# Patient Record
Sex: FEMALE | Race: WHITE
[De-identification: names, ages, dates, MRNs, and addresses within clinical notes are randomized per-mention and may not be internally consistent; named-entity substitution may affect disease eponyms.]

---

## 2018-02-08 ENCOUNTER — HOSPITAL ENCOUNTER (INPATIENT)
Dept: HOSPITAL 68 - ERH | Age: 63
LOS: 1 days | DRG: 552 | End: 2018-02-09
Attending: INTERNAL MEDICINE | Admitting: INTERNAL MEDICINE
Payer: COMMERCIAL

## 2018-02-08 VITALS — BODY MASS INDEX: 28.17 KG/M2 | HEIGHT: 64 IN | WEIGHT: 165 LBS

## 2018-02-08 VITALS — SYSTOLIC BLOOD PRESSURE: 122 MMHG | DIASTOLIC BLOOD PRESSURE: 76 MMHG

## 2018-02-08 VITALS — DIASTOLIC BLOOD PRESSURE: 80 MMHG | SYSTOLIC BLOOD PRESSURE: 112 MMHG

## 2018-02-08 DIAGNOSIS — E78.5: ICD-10-CM

## 2018-02-08 DIAGNOSIS — S12.400A: ICD-10-CM

## 2018-02-08 DIAGNOSIS — R55: ICD-10-CM

## 2018-02-08 DIAGNOSIS — R79.89: ICD-10-CM

## 2018-02-08 DIAGNOSIS — A08.4: ICD-10-CM

## 2018-02-08 DIAGNOSIS — E86.0: ICD-10-CM

## 2018-02-08 DIAGNOSIS — K52.9: ICD-10-CM

## 2018-02-08 DIAGNOSIS — S12.301A: Primary | ICD-10-CM
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
## 2018-02-08 DIAGNOSIS — E04.1: ICD-10-CM

## 2018-02-08 LAB
ABSOLUTE GRANULOCYTE CT: 8.2 /CUMM (ref 1.4–6.5)
APTT BLD: 24 SEC (ref 25–37)
BASOPHILS # BLD: 0 /CUMM (ref 0–0.2)
BASOPHILS NFR BLD: 0.1 % (ref 0–2)
EOSINOPHIL # BLD: 0 /CUMM (ref 0–0.7)
EOSINOPHIL NFR BLD: 0.4 % (ref 0–5)
ERYTHROCYTE [DISTWIDTH] IN BLOOD BY AUTOMATED COUNT: 13 % (ref 11.5–14.5)
GRANULOCYTES NFR BLD: 96.2 % (ref 42.2–75.2)
HCT VFR BLD CALC: 45 % (ref 37–47)
LYMPHOCYTES # BLD: 0.2 /CUMM (ref 1.2–3.4)
MCH RBC QN AUTO: 29.1 PG (ref 27–31)
MCHC RBC AUTO-ENTMCNC: 33.1 G/DL (ref 33–37)
MCV RBC AUTO: 87.8 FL (ref 81–99)
MONOCYTES # BLD: 0.1 /CUMM (ref 0.1–0.6)
PLATELET # BLD: 211 /CUMM (ref 130–400)
PMV BLD AUTO: 7.7 FL (ref 7.4–10.4)
PROTHROMBIN TIME: 11.6 SEC (ref 9.4–12.5)
RED BLOOD CELL CT: 5.12 /CUMM (ref 4.2–5.4)
WBC # BLD AUTO: 8.6 /CUMM (ref 4.8–10.8)

## 2018-02-08 PROCEDURE — G0480 DRUG TEST DEF 1-7 CLASSES: HCPCS

## 2018-02-08 NOTE — CONS- NEUROSURGICAL
General Information and HPI
 
Consulting Request
Date of Consult: 02/08/18
Requested By:
Rosas PONCE
 
Reason for Consult:
Neck pain after fall
Source of Information: patient, family, old records
Exam Limitations: no limitations
History of Present Illness:
62-year-old right-handed white female with no significant past history who 
presented to the emergency room stating that since 1 AM this morning she's had 
multiple episodes of nausea vomiting and diarrhea ASSOCIATED with lower 
abdominal cramping pains.  Around 4:30 in the morning she was sitting on the 
toilet when she had a syncopal episode.  She woke up with her face against a 
vanity which was in front of the toilet.  It is unsure how long she was not 
unconscious.  She was then brought in by her family to the emergency room here. 
She complains of bilateral neck pain and bruising around her left thigh from the
head strike into the vanity
 
Allergies/Medications
Allergies:
Coded Allergies:
No Known Allergies (02/08/18)
 
Current Medications:
 Current Medications
 
 
  Sig/Phyllis Start time  Last
 
Medication Dose Route Stop Time Status Admin
 
Acetaminophen 1,000 MG ONCE ONE 02/08 0800 DC 02/08
 
N/A 1 UNIT IV 02/08 0814  0828
 
Famotidine 20 MG ONCE ONE 02/08 0800 DC 02/08
 
  IV 02/08 0801  0828
 
Morphine Sulfate 0 .STK-MED ONE 02/08 1024 DC 
 
  .ROUTE   
 
Morphine Sulfate 2 MG ONCE ONE 02/08 1000 DC 02/08
 
  IV 02/08 1001  1020
 
Ondansetron HCl 4 MG ONCE ONE 02/08 0800 DC 02/08
 
  IV 02/08 0801  0828
 
Sodium Chloride 1,000 ML BOLUS ONE 02/08 1045 DC 02/08
 
  IV 02/08 1144  1158
 
Sodium Chloride 1,000 ML BOLUS ONE 02/08 0815 DC 02/08
 
  IV 02/08 0914  0828
 
Sodium Chloride 1,000 ML BOLUS ONE 02/08 0815 DC 02/08
 
  IV 02/08 0914  1040
 
 
 
 
Past History
 
Medical History
Blood Transfusion Hx: No
Neurological: NONE
EENT: NONE
Cardiovascular: NONE
Respiratory: NONE
Gastrointestinal: NONE
Hepatic: NONE
Renal: NONE
Musculoskeletal: NONE
Psychiatric: NONE
Endocrine: NONE
Blood Disorders: NONE
Cancer(s): NONE
GYN/Reproductive: tubal ligation
Other Medical Hx:
None
 
Surgical History
Pertinent Surgical History: tubal ligation, ABDOMINOPLASTY
 
Psychosocial History
Where Do You Live? Home
Who Do You Live With? spouse
Services at Home: None
Primary Language: English
Smoking Status: Never Smoked
ETOH Use: occasional use
Illicit Drug Use: denies illicit drug use
 
Functional Ability
ADLs
Independent: dressing, eating, toileting, bathing. 
Ambulation: independent
IADLs
Independent: shopping. 
 
Employment History
Employment: Retired
 
Review of Systems
Review of Systems:
She denies nausea or vomiting at this point in time.  She denies any chest pain 
or shortness of breath.  She was feeling lightheaded this morning.  She has no 
pain or dysesthesias or weakness in her extremities.
 
 
 
There is no back or arm or leg pain
 
There is no visual changes
 
Review of Systems
Constitutional:
Denies: no symptoms. 
EENTM:
Reports: see HPI. 
Cardiovascular:
Denies: no symptoms. 
Respiratory:
Denies: no symptoms. 
GI:
Denies: no symptoms. 
Genitourinary:
Denies: no symptoms. 
Musculoskeletal:
Reports: see HPI. 
Skin:
Denies: no symptoms. 
Neurological/Psychological:
Reports: see HPI. 
Hematologic/Endocrine:
Denies: no symptoms. 
Immunologic/Allergic:
Denies: no symptoms. 
All Other Systems: Reviewed and Negative
 
Exam & Diagnostic Data
Vital Signs and I&O
Vital Signs
 
 
Date Time Temp Pulse Resp B/P B/P Pulse O2 O2 Flow FiO2
 
     Mean Ox Delivery Rate 
 
02/08 1100  94  102/60     
 
02/08 1017 98.3 103 20 125/76  97 Room Air  
 
02/08 0713 97.4 114 15 123/86  97 Room Air Room Air 
 
 
 Intake & Output
 
 
 02/08 1600 02/08 0800 02/08 0000 02/07 1600 02/07 0800 02/07 0000
 
Intake Total 0     
 
Output Total      
 
Balance 0     
 
       
 
Intake, Oral 0     
 
Patient  165 lb    
 
Weight      
 
Weight  Reported by Patient    
 
Measurement      
 
Method      
 
 
 
Physical Exam:
Awake alert and oriented 3.  Cranial nerve symmetrical.  Some spasm of the 
paraspinous muscles of the neck.
 
No nystagmus no dysmetria
 
Good strength in both upper and lower extremities.  No Estefany's or Babinski.  
Slightly hyperreflexic at all levels to 3+ but no evidence of clonus or definite
myelopathy
 
Physical Exam
General Appearance: no apparent distress
Head: ecchymosis
Eyes:
Bilateral: normal appearance. 
Ears, Nose, Throat: normal pharynx
Neck: wearing a hard cervical collar
Respiratory: normal breath sounds
Cardiovascular: regular rate/rhythm
Breasts deferred
Peripheral Pulses:
3+ carotid (R), 3+ carotid (L)
Gastrointestinal: soft
Rectal: deferred
Back: muscle spasm
Extremities: normal inspection
Neurologic/Psych: awake, alert, oriented x 3
Cranial Nerves: normal hearing, normal speech, PERRL
Reflexes:
2+: ankle (R), ankle (L).  3+: bicep (R), bicep (L), tricep (L), tricep (L), 
knee (R), knee (L). 
Skin: intact
Lymphatic: no anterior cervical gokul
Reproductive: Deffered
Pelvic: deferred
Other Physical Findings:
No other significant findings
Last 24 Hours of Labs:
 Laboratory Tests
 
 
 02/08 02/08
 
 1130 0902
 
Chemistry  
 
  Sodium (137 - 145 mmol/L)  141
 
  Potassium (3.5 - 5.1 mmol/L)  4.3
 
  Chloride (98 - 107 mmol/L)  107
 
  Carbon Dioxide (22 - 30 mmol/L)  23
 
  Anion Gap (5 - 16)  12
 
  BUN (7 - 17 mg/dL)  28  H
 
  Creatinine (0.5 - 1.0 mg/dL)  0.7
 
  Estimated GFR (>60 ml/min)  > 60
 
  BUN/Creatinine Ratio (7 - 25 %)  40.0  H
 
  Glucose (65 - 99 mg/dL)  121  H
 
  Calcium (8.4 - 10.2 mg/dL)  9.1
 
  Total Bilirubin (0.2 - 1.3 mg/dL)  0.8
 
  AST (14 - 36 U/L)  20
 
  ALT (9 - 52 U/L)  21
 
  Alkaline Phosphatase (<127 U/L)  65
 
  Troponin I (< 0.11 ng/ml)  < 0.01
 
  Total Protein (6.3 - 8.2 g/dL)  6.8
 
  Albumin (3.5 - 5.0 g/dL)  4.0
 
  Globulin (1.9 - 4.2 gm/dL)  2.8
 
  Albumin/Globulin Ratio (1.1 - 2.2 %)  1.4
 
  Amylase (30 - 110 U/L)  52
 
  Lipase (23 - 300 U/L)  70
 
Coagulation  
 
  PT (9.4 - 12.5 SEC) 11.6 
 
  INR (0.90 - 1.19) 1.11 
 
  APTT (25 - 37 SEC) 24  L 
 
Hematology  
 
  CBC w Diff  MAN DIFF ORDERED
 
  WBC (4.8 - 10.8 /CUMM)  8.6
 
  RBC (4.20 - 5.40 /CUMM)  5.12
 
  Hgb (12.0 - 16.0 G/DL)  14.9
 
  Hct (37 - 47 %)  45.0
 
  MCV (81.0 - 99.0 FL)  87.8
 
  MCH (27.0 - 31.0 PG)  29.1
 
  MCHC (33.0 - 37.0 G/DL)  33.1
 
  RDW (11.5 - 14.5 %)  13.0
 
  Plt Count (130 - 400 /CUMM)  211
 
  MPV (7.4 - 10.4 FL)  7.7
 
  Gran % (42.2 - 75.2 %)  96.2  H
 
  Lymphocytes % (20.5 - 51.1 %)  1.8  L
 
  Monocytes % (1.7 - 9.3 %)  1.5  L
 
  Eosinophils % (0 - 5 %)  0.4
 
  Basophils % (0.0 - 2.0 %)  0.1
 
  Absolute Granulocytes (1.4 - 6.5 /CUMM)  8.2  H
 
  Segmented Neutrophils (42.2 - 75.2 %)  86  H
 
  Band Neutrophils (0.0 - 5.0 %)  7  H
 
  Absolute Lymphocytes (1.2 - 3.4 /CUMM)  0.2  L
 
  Lymphocytes (20.5 - 51.1 %)  3  L
 
  Monocytes (1.7 - 9.3 %)  4
 
  Absolute Monocytes (0.10 - 0.60 /CUMM)  0.1
 
  Absolute Eosinophils (0.0 - 0.7 /CUMM)  0
 
  Absolute Basophils (0.0 - 0.2 /CUMM)  0
 
  Platelet Estimate (ADEQUATE)  ADEQUATE
 
  Normocytic RBCs  VERIFIED
 
  Normochromic RBCs  VERIFIED
 
Toxicology  
 
  Serum Alcohol (<10 MG/DL)  < 10.0
 
 
 
Imaging Results:
CAT scan shows a fracture through the C4 spinous process extending to the 
undersurface of the right L4 lamina extending to the bony foramen 
transversarium.  Fracture through C5 spinous process.  Chronic anterolisthesis 
C2 over C3 and C3 over C4 and C7 over T1
Other Results:
Pending medical workup
 
Assessment/Plan
Assessment/Plan
The fractures in this woman are not unstable.  At this point in time given the 
absence of any symptoms in her arms and legs with not rush to do a MRI.  She can
be followed as an outpatient with her neck in a hard collar.  Possibly at 3 
weeks we could transfer her to a soft collar.  Today CTA is normal breast there 
is no vascular injury.
 
Findings on x-ray were discussed with Jerry Ahuja MD
Problem List:
 1. C4 cervical fracture
 
 2. C5 vertebral fracture
 
 3. Syncope
 
 4. Nausea vomiting and diarrhea
 
Other Findings/Comments:
Dr. Ross
 
Consult Acknowledgment
- Thank you for your consult request.

## 2018-02-08 NOTE — CT SCAN REPORT
CT HEAD WITHOUT IV CONTRAST
CT CERVICAL SPINE WITHOUT IV CONTRAST
 
INDICATION: Fall.
 
COMPARISON: None available.
 
TECHNIQUE: Multidetector CT acquisitions of the head and cervical spine were
obtained without IV contrast. Multiplanar reformats were acquired and
utilized for image interpretation.
 
FINDINGS:
 
HEAD:
There is no intracranial hemorrhage, hydrocephalus, extra-axial surface
collection, midline shift, or other herniation pattern. Gray to white matter
differentiation is diffusely maintained without evidence of an evolved acute
territorial infarct. The basilar cisterns are preserved. No significant soft
tissue abnormality. No acute osseous abnormality. The paranasal sinuses and
the mastoid air cells are well-aerated.
 
CERVICAL SPINE:
There is a fracture through the C4 spinous process extending to the
undersurface of the right C4 lamina. There is also a fracture through the
posterolateral wall of the right C4 bony foramen transversarium that can be
followed with a CTA of the neck to exclude a vertebral artery dissection.
Small osteophyte versus inferior endplate avulsion fragment on the left at
C4. Given this finding, a MRI of the cervical spine would be helpful in
excluding the presence of ligamentous injury and/or epidural collections.
 
There is also a fracture through the C5 spinous process with a small spinous
process avulsion fracture fragment posteriorly.
 
There is slight anterolisthesis of C2 on C3 and C3 on C4. There is mild
anterolisthesis of C7 on T1, T1 and T2, and T2 on T3. There is slight
superior endplate height loss at T3 of indeterminate age. The craniocervical
and atlantoaxial articulations are normal. There is no prevertebral soft
tissue swelling. Multinodular thyroid gland. The visualized lung apices are
clear.
 
IMPRESSION:
- There is a fracture through the C4 spinous process extending to the
undersurface of the right C4 lamina. There is also a fracture through the
posterolateral wall of the right C4 bony foramen transversarium that can be
followed with a CTA of the neck to exclude a vertebral artery dissection.
Small osteophyte versus inferior endplate avulsion fragment on the left at
C4. Given this finding, a MRI of the cervical spine would be helpful in
excluding the presence of ligamentous injury and/or epidural collections.
 
- There is also a fracture through the C5 spinous process with a small
spinous process avulsion fracture fragment posteriorly.
 
- There is slight superior endplate height loss at T3 of indeterminate age.
 
- No acute intracranial abnormality.
 
-Multinodular thyroid gland.

## 2018-02-08 NOTE — ED SYNCOPE COMPLAINT
History of Present Illness
 
General
Chief Complaint: Nausea, Vomiting, Diarrhea
Stated Complaint: V/N/D PER PT PASSED OUT WHILE ON TOILET HIT FACE
Source: patient, family, old records
Exam Limitations: no limitations
 
Vital Signs & Intake/Output
Vital Signs & Intake/Output
 Vital Signs
 
 
Date Time Temp Pulse Resp B/P B/P Pulse O2 O2 Flow FiO2
 
     Mean Ox Delivery Rate 
 
 1250 98.7 110 20 114/75  98 Room Air  
 
 1100  94  102/60     
 
02 1017 98.3 103 20 125/76  97 Room Air  
 
 0713 97.4 114 15 123/86  97 Room Air Room Air 
 
 
 
Allergies
Coded Allergies:
No Known Allergies (18)
 
Reconcile Medications
No Known Home Medications
 
Triage Note:
PT TO ED FOR C/C OF MULTIPLE EPISODES OF VOMITING
AND DIARRHEA SINCE 100.  AROUND 0, PT HAD A
SYNCOPAL EPISODE WHEN PT WAS VOMITING AND THEN
PASSED OUT (UNKNOWN HOW LONG), AND WOKE UP WITH
BRUISE TO L EYE AREA. +NECK PAIN.  DENIES CHANGE
IN VISION.
Triage Nurses Notes Reviewed? yes
Timing: recent history
Context: multiple episodes of diarrhea                    
Loss of Consciousness: unsure
Associated Symptoms: nausea/vomiting
HPI:
62-year-old female with no medical history presents to the ER for evaluation 
stating that since 1:00 this morning she's had multiple episodes of nausea 
vomiting and diarrhea associated with lower abdominal cramping aching pain.  The
patient states around 4:30 in the morning she was sitting on a toilet when she 
states the next thing she recalls she woke up with her face against the vanity 
which is in front of the toilet.  She is unsure how long she passed out for.  
She denies any associated chest pain shortness of breath.  She states she's felt
lightheaded this morning.  She denies tobacco or alcohol use.  The patient has a
history of a tubal ligation and abdominoplasty.  She is also complaining of 
bilateral neck pain and bruising around her left eye from the head strike into 
the vanity.  She has not taken anything for symptoms.  No vision changes 
epistaxis.  No back arm or leg pain.
 
(Darin PONCE,Rosas)
 
Past History
 
Travel History
Traveled to [place] past 21 day No
 
Medical History
Any Pertinent Medical History? none
Neurological: NONE
EENT: NONE
Cardiovascular: NONE
Respiratory: NONE
Gastrointestinal: NONE
Hepatic: NONE
Renal: NONE
Musculoskeletal: NONE
Psychiatric: NONE
Endocrine: NONE
Blood Disorders: NONE
Cancer(s): NONE
 
Surgical History
Surgical History: tubal ligation, ABDOMINOPLASTY
 
Psychosocial History
What is your primary language English
Tobacco Use: Quit >30 days ago
ETOH Use: occasional use
Illicit Drug Use: denies illicit drug use
 
Family History
Hx Contributory? No
(Rosas Damon)
 
Review of Systems
 
Review of Systems
Constitutional:
Reports: see HPI. 
Comments
Review of systems: See HPI, All other systems negative.
Constitutional, no chills no fever,
HEENT:  no sore throat no congestion
Cardiovascular: No chest pain 
Skin:  no rashes, no change in skin
Respiratory: No dyspnea no cough no  sputum
GI: SEE HPI
: No dysuria No hematuria, no frequency
Muscle skeletal: No joint pain, no back pain
Neurologic: headache
Psych: No stress 
Heme/endocrine: No bruising 
Immunology: No lymphadenopathy
(Rosas Damon)
 
Physical Exam
 
Physical Exam
General Appearance: well developed/nourished, alert, awake
Cranial Nerves: normal hearing, normal speech, PERRL
Comments:
Well-developed well-nourished person in no acute distress
HEENT: Small area of ecchymosis noted under the left eye there is no hyphema, 
the rest of the face is atraumatic and nontender no scalp hematoma, Normal EENT 
exam; PERRL, EOMI, no nystagmus. moist mucous membranes.  
Neck: Supple, nontender, normal range of motion without pain or tenderness
Back: Nontender. Full range of motion
Cardiovascular: Regular rate and rhythms no murmur
Respiratory: No respiratory distress.  Patient speaking in full complete 
sentences. Breath sounds clear to auscultation bilaterally: NO W/R/R
Abdomen: Soft, nontender nondistended, no appreciable organomegaly. Normal bowel
sounds. No rebound/guarding, 
Extremity: No edema, full range of motion of extremities, 5 out of 5 strength 
noted to bilateral upper and lower extremities, full sensation noted to b/l ue 
and le
Neuro: Alert oriented x3, motor sensory normal, cranial nerves II through XII 
grossly intact. There were no obvious focal neurologic abnormalities.
Skin: No appreciable rash on exposed skin, skin is warm and dry.
Psych: Mood and affect is normal, memory and judgment is normal.
 
Core Measures
ACS in differential dx? Yes
CVA/TIA Diagnosis: No
Sepsis Present: No
Sepsis Focused Exam Completed? No
(Darin PONCE,Rosas)
 
Progress
Differential Diagnosis: orthostatic syncope, other valvular disease, 
vasodepressor syncope, gastroenteritis, colitis, diverticulitis, ischemic 
colitis, electrolyte abnormalty, dehydaration, ami
Plan of Care:
 Orders
 
 
Procedure Date/time Status
 
Regular Diet  L Complete
 
Heart Healthy Diet  D Active
 
TROPONIN LEVEL  1500 Active
 
ED Holding Orders  1356 Active
 
Admit to inpatient  1356 Active
 
EKG  1307 Active
 
ECHOCARDIOGRAM  1257 Active
 
Lab Add-on Test  1254 Active
 
Pathway - chart  1236 Active
 
House Staff  1236 Active
 
Patient Data  1231 Active
 
ED Holding Orders  1218 Active
 
Vital Signs  1218 Active
 
Code Status  1218 Active
 
Add-on Test (ER Only)  1027 Active
 
EKG  1027 Active
 
Add-on Test (ER Only)  1018 Active
 
Telemetry/Cardiac Monitor  1013 Active
 
PARTIAL THROMBOPLASTIN TIME  1013 Complete
 
PROTHROMBIN TIME  1013 Complete
 
Add-on Test (ER Only)  1007 Active
 
Add-on Test (ER Only)  0949 Active
 
ETHANOL  0902 Complete
 
Intake & Output  0829 Active
 
MISTAKE  0720 Active
 
URINALYSIS  0720 Complete
 
TROPONIN LEVEL  0720 Complete
 
LIPASE  0720 Complete
 
COMPREHENSIVE METABOLIC PANEL  0720 Complete
 
CBC WITHOUT DIFFERENTIAL  0720 Complete
 
AMYLASE  0720 Complete
 
EKG  0717 Active
 
VTE Mechanical Prophylaxis   UNK Active
 
MISTAKE   UNK Active
 
 
 Current Medications
 
 
  Sig/Phyllis Start time  Last
 
Medication Dose  Stop Time Status Admin
 
Enoxaparin Sodium 40 MG DAILY  1000 AC 
 
(Lovenox)     
 
Ibuprofen 800 MG TID  1600 AC 
 
(Motrin)     
 
Acetaminophen 500 MG Q6P PRN  1400 AC 
 
(Tylenol)     
 
 
 Laboratory Tests
 
 
 
18 1420:
Urine Color YEL, Urine Clarity CLEAR, Urine pH 6.5, Ur Specific Gravity 1.010, 
Urine Protein NEG, Urine Ketones NEG, Urine Nitrite NEG, Urine Bilirubin NEG, 
Urine Urobilinogen 0.2, Ur Leukocyte Esterase NEG, Ur Microscopic EXAM NOT 
REQUIRED, Urine Hemoglobin NEG, Urine Glucose NEG
 
18 1130:
PT 11.6, INR 1.11, APTT 24  L
 
18 0902:
Anion Gap 12, Estimated GFR > 60, BUN/Creatinine Ratio 40.0  H, Glucose 121  H, 
Calcium 9.1, Total Bilirubin 0.8, AST 20, ALT 21, Alkaline Phosphatase 65, 
Troponin I < 0.01, Total Protein 6.8, Albumin 4.0, Globulin 2.8, Albumin/
Globulin Ratio 1.4, Amylase 52, Lipase 70, CBC w Diff MAN DIFF ORDERED, RBC 5.12
, MCV 87.8, MCH 29.1, MCHC 33.1, RDW 13.0, MPV 7.7, Gran % 96.2  H, Lymphocytes 
% 1.8  L, Monocytes % 1.5  L, Eosinophils % 0.4, Basophils % 0.1, Absolute 
Granulocytes 8.2  H, Segmented Neutrophils 86  H, Band Neutrophils 7  H, 
Absolute Lymphocytes 0.2  L, Lymphocytes 3  L, Monocytes 4, Absolute Monocytes 
0.1, Absolute Eosinophils 0, Absolute Basophils 0, Platelet Estimate ADEQUATE, 
Normocytic RBCs VERIFIED, Normochromic RBCs VERIFIED, Serum Alcohol < 10.0
orthos negative, labs ordered, pt med with iv fluids, zofran 4mg iv, pepcid 20mg
iv, tylenol 1g iv. ct ordered
 
 
950 I discussed with andriy and her son her ct findings and labs to date, pain
persists, morphine 2mg iv ordered, c collar placed. call placed to neurosurg
 
 
1010 CASE D/W DR DAVIS WHO REVIEWED THE CT SCAN, ADVISED TO GET CTA OF NECK, AS
LONG AS NO VESSEL INJURY PATIENT COULD BE DISCHARGED AND FOLLOW UP WITH HIM AS 
AN OUTPT FROM HIS STANDPOINT- FX ARE NOT UNSTABLE. NO NEED FOR EMERGENT MRI 
HOWEVER WOULD LIKE CTA AT THIS TIME.  HE DOES NOT FEEL PT REQUIRES TRANSFER AT 
THIS TIME BASED ON CSPINE INJURIES. I DISCUSSED WITH THE PT AND HER SON PLAN OF 
CARE, SHE IS RESTING IN NAD AT THIS TIME
 
 
As nurse was attempting to place second IV line patient had witnessed syncopal 
episode she remained normal sinus on the monitor Dr. Ross at bedside, IV 
fluids are running patient immediately came to speaking in full complete 
sentences no slurred speech no neuro deficits.
 
1220 DR DAVIS IN DEPT TO EVAL PT 
 
 
I discussed the patient hurt CAT scan results she denies abdominal pain resting 
comfortably
 
Dr. oRss spoke with Dr. Edouard we'll admit to telemetry.
Diagnostic Imaging:
Viewed by Me: CT Scan.  Discussed w/RAD: CT Scan. 
Radiology Impression: PATIENT: ANDRIY DIXON  MEDICAL RECORD NO: [id number] 
PRESENT AGE: 62  PATIENT ACCOUNT NO: [id number] : 55  LOCATION: ER 
ORDERING PHYSICIAN: Rosas PONCE     SERVICE DATE:  EXAM TYPE: 
CAT - CT ABD & PELVIS W/O IV CONTRAS EXAMINATION: CT ABDOMEN AND PELVIS WITHOUT 
CONTRAST CLINICAL INFORMATION: Left lower quadrant abdominal pain. Nausea, 
vomiting, diarrhea. Syncope. Presumptive diagnosis: Colitis, diverticulitis. 
COMPARISON: None TECHNIQUE: Multidetector volumetric imaging was performed from 
the superior aspect of the liver through the pubic symphysis. Sagittal and 
coronal reformatted images were obtained on the technologist's workstation. DLP:
314.37 mGy-cm FINDINGS: LUNG BASES: The visualized lung bases are unremarkable. 
LIVER, GALLBLADDER, AND BILIARY TREE: The liver is normal in size, shape, and 
attenuation. No focal hepatic lesion or biliary ductal dilatation is present. 
The gallbladder is unremarkable with no evidence of radiopaque gallstones, 
gallbladder wall thickening, or obvious pericholecystic inflammatory changes. 
PANCREAS: Unremarkable. SPLEEN: Unremarkable. ADRENAL GLANDS: Unremarkable. 
KIDNEYS AND URETERS: The kidneys are normal in size, shape, and attenuation. No 
hydronephrosis, hydroureter, or calculi are seen. No perinephric stranding. 
BLADDER: Unremarkable. GASTROINTESTINAL TRACT: There are scattered colonic 
diverticula. There is no evidence of diverticulitis. There is no definite 
colonic wall thickening to suggest colitis. The appendix appears normal. There 
are 2 loops of jejunum in the mid abdomen which are upper normal in caliber. 
There is no definite bowel wall thickening. The small bowel is otherwise 
unremarkable. ABDOMINAL WALL: No significant hernia is appreciated. LYMPH NODES:
Normal. VASCULAR: Unremarkable. PELVIC VISCERA: Unremarkable. OSSEOUS STRUCTURES
: There is severe degenerative disc disease at L3-L4 with a mild grade 1 
anterolisthesis of L3 on L4. There is mild degenerative disc disease at L4-L5. 
IMPRESSION: 1. Mild colonic diverticulosis with no evidence of diverticulitis. 
No colonic wall thickening to suggest suggest colitis. 2. There are 2 loops of 
jejunum in the mid abdomen which are upper normal in caliber but there is no 
evidence of small bowel wall thickening. 3. No other evidence of inflammatory 
process in the abdomen or pelvis. DICTATED BY: Ky Wetzel MD  DATE/TIME 
DICTATED:18 :RAD.LOCO  DATE/TIME TRANSCRIBED:933 CONFIDENTIAL, DO NOT COPY WITHOUT APPROPRIATE AUTHORIZATION.  <
Electronically signed in Other Vendor System>                                   
                                                    SIGNED BY: Ky Wetzel MD 18 1003, PATIENT: ANDRIY DIXON  MEDICAL RECORD NO: [id number] 
PRESENT AGE: 62  PATIENT ACCOUNT NO: [id number] : 55  LOCATION: [facility] 
ORDERING PHYSICIAN: Rosas PONCE     SERVICE DATE:  EXAM TYPE: 
CAT - CT CERV SPINE WO IV CONTRAST; CT HEAD WO IV CONTRAST CT HEAD WITHOUT IV 
CONTRAST CT CERVICAL SPINE WITHOUT IV CONTRAST INDICATION: Fall. COMPARISON: 
None available. TECHNIQUE: Multidetector CT acquisitions of the head and 
cervical spine were obtained without IV contrast. Multiplanar reformats were 
acquired and utilized for image interpretation. FINDINGS: HEAD: There is no 
intracranial hemorrhage, hydrocephalus, extra-axial surface collection, midline 
shift, or other herniation pattern. Gray to white matter differentiation is 
diffusely maintained without evidence of an evolved acute territorial infarct. 
The basilar cisterns are preserved. No significant soft tissue abnormality. No 
acute osseous abnormality. The paranasal sinuses and the mastoid air cells are 
well-aerated. CERVICAL SPINE: There is a fracture through the C4 spinous process
extending to the undersurface of the right C4 lamina. There is also a fracture 
through the posterolateral wall of the right C4 bony foramen transversarium that
can be followed with a CTA of the neck to exclude a vertebral artery dissection.
Small osteophyte versus inferior endplate avulsion fragment on the left at C4. 
Given this finding, a MRI of the cervical spine would be helpful in excluding 
the presence of ligamentous injury and/or epidural collections. There is also a 
fracture through the C5 spinous process with a small spinous process avulsion 
fracture fragment posteriorly. There is slight anterolisthesis of C2 on C3 and 
C3 on C4. There is mild anterolisthesis of C7 on T1, T1 and T2, and T2 on T3. 
There is slight superior endplate height loss at T3 of indeterminate age. The 
craniocervical and atlantoaxial articulations are normal. There is no 
prevertebral soft tissue swelling. Multinodular thyroid gland. The visualized 
lung apices are clear. IMPRESSION: - There is a fracture through the C4 spinous 
process extending to the undersurface of the right C4 lamina. There is also a 
fracture through the posterolateral wall of the right C4 bony foramen 
transversarium that can be followed with a CTA of the neck to exclude a 
vertebral artery dissection. Small osteophyte versus inferior endplate avulsion 
fragment on the left at C4. Given this finding, a MRI of the cervical spine 
would be helpful in excluding the presence of ligamentous injury and/or epidural
collections. - There is also a fracture through the C5 spinous process with a 
small spinous process avulsion fracture fragment posteriorly. - There is slight 
superior endplate height loss at T3 of indeterminate age. - No acute 
intracranial abnormality. -Multinodular thyroid gland. DICTATED BY: Roberto Feng MD  DATE/TIME DICTATED:18 :RAD.LOCO  DATE/TIME
TRANSCRIBED:18 CONFIDENTIAL, DO NOT COPY WITHOUT APPROPRIATE 
AUTHORIZATION.  <Electronically signed in Other Vendor System>                  
                                                                     SIGNED BY: 
Roberto Feng MD 1841, PATIENT: ANDRIY DIXON  MEDICAL RECORD NO: 
[id number] PRESENT AGE: 62  PATIENT ACCOUNT NO: [id number] : 55  LOCATION: [facility]
ORDERING PHYSICIAN: Rosas PONCE     SERVICE DATE:  EXAM TYPE: 
CAT - CT NECK ANGIOGRAM CT ANGIOGRAM NECK CLINICAL INFORMATION: Rule out 
vertebral artery dissection. C4-C5 fracture. COMPARISON: Cervical spine CT 
performed earlier the same day. TECHNIQUE: Test bolus sequences followed by 
administration of 125 mL of Omnipaque 350 intravenous contrast. Helical imaging 
was performed in the axial plane of the neck. The data was processed at the CT 
technologist workstation for generation of MIP sequences. Three-dimensional 
volume rendered reformatted images were also generated at an offline 3-D 
workstation. Stenoses graded per NASCET criteria. FINDINGS: There is a 3 great 
vessel branch configuration off of the aortic arch. The great vessel origins are
widely patent. The proximal left subclavian artery is tortuous. The right 
vertebral artery is dominant. There is no evidence of traumatic arterial injury.
There are no intraluminal filling defects and there is no luminal caliber change
along the course of the vertebral arteries on either side. The common carotid 
arteries, the carotid bifurcations, and the cervical internal carotid artery 
segments are widely patent. The intracranial arterial vasculature is 
unremarkable. Stable appearing fracture through the C4 spinous process extending
to the undersurface of the right C4 lamina. There is also a fracture through the
posterolateral wall of the right C4 bony foramen transversarium. Small 
osteophyte versus inferior endplate avulsion fragment on the left at C4. Given 
this finding, a MRI of the cervical spine would be helpful in excluding the 
presence of ligamentous injury and/or epidural collections.  There is also a 
fracture through the C5 spinous process with a small spinous process avulsion 
fracture fragment posteriorly. Degenerative changes throughout the cervical 
spine are described in detail on the recent cervical spine CT. Stable appearing 
superior end plate height loss at T3 of indeterminate age. The imaged upper 
lungs are clear. There is a 1.4 cm right thyroid lobe nodule that should be 
further assessed with a nonemergent ultrasound. There are no additional 
significant soft tissue findings within the neck. IMPRESSION: - Stable appearing
C4 and C5 fractures as discussed in detail on the cervical spine CT performed 
earlier today. - There is no evidence of traumatic arterial injury/arterial 
dissection. No significant arterial stenoses and no acute arterial occlusions 
within the neck. - Stable appearing indeterminate age superior endplate height 
loss at T3. - There is a 1.4 cm right thyroid lobe nodule that should be further
assessed with a nonemergent ultrasound. DICTATED BY: Roberto Feng MD  DATE/
TIME DICTATED:18 :RAD.LOCO  DATE/TIME TRANSCRIBED:
18 CONFIDENTIAL, DO NOT COPY WITHOUT APPROPRIATE AUTHORIZATION.  <
Electronically signed in Other Vendor System>                                   
                                                    SIGNED BY: Roberto Feng MD
18 1213
Initial ED EKG: stach at 100, no acute st seg changes, normala axis
Rhythm Strip: normal sinus rhythm
(Rosas Damon)
Comments:
During a blood draw patient began to feel very lightheaded and had a witnessed 
syncopal episode.  Patient was on telemetry and she remained in normal sinus 
rhythm during.  An EKG was obtained which showed sinus rhythm with no ST-T 
changes.  Patient's pain in her blood pressure was 70 over palp.  Once patient 
was laid supine she came to.
(Mejia Ross MD)
 
Departure
 
Departure
Time of Disposition: 1225
Disposition: STILL A PATIENT
Condition: Stable
Referrals:
Victor Manuel Kaur MD (PCP/Family)
 
Departure Forms:
Customer Survey
General Discharge Information
Prescriptions:
Current Visit Scripts
No Known Home Medications     
 
 
Admission Note
Spoke With:
Bhavani Hernandez MD
Documentation of Exam:
Documentation of any treatments & extenuating circumstances including Concerns 
Regarding Discharge (functional status, medication knowledge or non-compliance, 
living conditions, etc.) that warrant an admission rather than observation: 
Neurosurgery consult cardiology consult telemetry monitoring IV fluids IV pain 
medication possible PT consult premature discharge would BE medically harmful
 
(Rosas Damon)
 
Departure
Clinical Impression
Primary Impression: C4 cervical fracture
Qualifiers:  Encounter type: initial encounter Fracture type: closed Fracture 
morphology: other fracture
Secondary Impressions: 
C5 vertebral fracture
Qualifiers:  Encounter type: initial encounter Fracture type: closed Fracture 
morphology: unspecified fracture morphology
 
Nausea vomiting and diarrhea
 
Syncope
Qualifiers:  Syncope type: unspecified Qualified Code: R55 - Syncope and 
collapse
 
 
Admission Note
Documentation of Exam:
Documentation of any treatments & extenuating circumstances including Concerns 
Regarding Discharge (functional status, medication knowledge or non-compliance, 
living conditions, etc.) that warrant an admission rather than observation: 
 
 
Observation Note
Rationale for Observation:
My rational for observation is as follows .
 
 
PA/NP Co-Sign Statement
Statement:
ED Attending supervision documentation-
 
[X] I saw and evaluated the patient. I have also reviewed all the pertinent lab 
results and diagnostic results. I agree with the findings and the plan of care 
as documented in the PA's/NP's documentation. 
 
[X] I have reviewed the ED Record and agree with the PA's/NP's documentation.
 
[] Additions or exceptions (if any) to the PAs/NP's note and plan are 
summarized below:
[Patient had a syncopal episode with no prodromal symptoms this morning.  
Patient has a C4 and C5 fracture.  Patient has been seen by neurosurgery.  
Patient will require admission to the hospital for telemetry monitoring and 
serial enzymes.  She will need cardiology evaluation and workup.  She will need 
physical therapy evaluation and workup.  It is unknown at this time with the 
patient will require short-term rehabilitation.]
 
(Mejia Ross MD)
 
Critical Care Note
 
Critical Care Note
Critical Care Time: 30-74 min
(Rosas Damon)

## 2018-02-08 NOTE — HISTORY & PHYSICAL
Jackson POPE,Gabo 02/08/18 1232:
General Information and HPI
MD Statement:
I have seen and personally examined ANDRIY DIXON and documented this H&P.
 
The patient is a 62 year old F who presented with a patient stated chief 
complaint of syncope..
 
Exam Limitations: no limitations
History of Present Illness:
62-year-old female with no medical history presents to the ED with chief 
complaint of vomiting/diarrhea and an episode of loss of consciousness.  She 
reports around 1 am today, she experienced vague diffuse abdominal pain which 
resulted in nonbloody vomitus and diarrhea.  At around 4 AM she proceeded to the
bathroom for another episode of diarrhea and without any premonition she found 
herself on the floor with her face planted down. She does not remember for how 
long she had lost consciousness, she then decided crawl out  of the bathroom and
proceeded to calling out for help .  She was not reported to have had any focal 
neurological deficit such as facial droopiness or dysarthria.  No report of any 
postictal confusion or bladder or urinary incontinence. Denies any prodromal 
symptoms such as such as lightheadedness, dizziness,cp/palpitation prior to the 
syncopal event.  He does not endorse any recent history of illness,sick contacts
or decreased fluid intake.  Daughter was by bedside and  is reporting that she 
has recently notice  that the patient having transient mild episodes of slight 
head tremors and mild fortgetfulness. The patient however denies any significant
neurological complaints including memory loss, and reports being independent 
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
her profession).
 
Allergies/Medications
Allergies:
Coded Allergies:
No Known Allergies (02/08/18)
 
Home Med list
No Known Home Medications
 
 
Past History
 
Travel History
Traveled to [place] past 21 day No
 
Medical History
Neurological: NONE
EENT: NONE
Cardiovascular: NONE
Respiratory: NONE
Gastrointestinal: NONE
Hepatic: NONE
Renal: NONE
Musculoskeletal: NONE
Psychiatric: NONE
Endocrine: NONE
Blood Disorders: NONE
Cancer(s): NONE
 
Surgical History
Surgical History: tubal ligation, ABDOMINOPLASTY
 
Past Family/Social History
 
Psychosocial History
ETOH Use: occasional use
Illicit Drug Use: denies illicit drug use
 
Review of Systems
 
Review of Systems
Constitutional:
Reports: see HPI. 
 
Exam & Diagnostic Data
Last 24 Hrs of Vital Signs/I&O
 Vital Signs
 
 
Date Time Temp Pulse Resp B/P B/P Pulse O2 O2 Flow FiO2
 
     Mean Ox Delivery Rate 
 
02/08 1250 98.7 110 20 114/75  98 Room Air  
 
02/08 1100  94  102/60     
 
02/08 1017 98.3 103 20 125/76  97 Room Air  
 
02/08 0713 97.4 114 15 123/86  97 Room Air Room Air 
 
 
 Intake & Output
 
 
 02/08 1600 02/08 0800 02/08 0000
 
Intake Total 1000  
 
Output Total   
 
Balance 1000  
 
    
 
Intake, IV 1000  
 
Intake, Oral 0  
 
Patient  74.843 kg 
 
Weight   
 
Weight  Reported by Patient 
 
Measurement   
 
Method   
 
 
 
 
Physical Exam
General Appearance Alert, Oriented X3, Cooperative
Skin No Significant Lesion, echymotic areas arounf left eye
Skin Temp/Moisture Exam: Warm/Dry
Sepsis Skin Exam (color): Normal for Ethnicity
HEENT PERRLA, ecchymosis around the left eye, no edema or profound swelling, 
rest of neck is atruamatic
Neck Supple, No JVD
Lymphatic Cervical nl
Cardiovascular Regular Rate, Normal S1, Normal S2, No Murmurs
Lungs Clear to Auscultation, Normal Air Movement
Abdomen Normal Bowel Sounds, Soft, No Tenderness
Neurological Normal Gait, Normal Speech, Strength at 5/5 X4 Ext, Sensation 
Intact, Cranial Nerves 3-12 NL, Reflexes 2+
Extremities No Clubbing, No Cyanosis
Vascular Normal Pulses, Pulses Symmetrical
 
Assessment/Plan
Assessment:
 
Impression
* Syncope (2 episodes).  Given the acute onset of gastroenteritis with diarrhea 
and vomiting and laboratory finding of elevated BUN/creatinine ratio, 
orthostatic hypotension from dehydration is a possible etiology of patient 
syncope.  However it is noted that patient was orthostatic negative during 
admission.  Syncope of cardiac etiology is always a concern.However the fact 
that patient does not have any past medical history of structural cardiac 
abnormalities such as valvular pathology, no known history of conduction 
abnormalities, no symptoms of chest pain, palpitation, shortness of breath, and 
unremarkable EKG and troponin; her cardiac etiology is less likely.  Vasovagal 
syncope is the most , and benign cause of syncope and can be considered as a 
diagnosis of exclusion.  There are some strong mitigating factors which strongly
suggests that the patient syncope is most likely from vasovagal phenomenon. 
These factors are: that her first episode was in the bathroom while having a 
bowel movement and the second one was preceded by a  strong premonition during 
the multiple attempts of a phlebotomy draw.  Seizures rather than syncope is 
always considered when a patient has episodes of loss of consciousness, however 
given the lack of seizure-like activity especially during the second witnessed 
episode, no postictal confusion or loss of bladder/bowel continence, seizures 
are less likely to be the patient's cause of symptoms.  Unsure  of the 
significance of  recent reported transient episodes of patient's head slight 
tremors(according to the daughter). Syncope is one of the most undereported 
symptom of a pulmonary embolism, her tachycardia is probably from dehydrating 
from gastroeneteritis rather than possibel PE, and she is saturating well on RA.
However, her tachycardia, could also be concerning for a PE considering 2 
episodes of syncope today. She does endorse some lower extremity tenderness, 
workup for a possible VTE is not unreasonable.
 
* Cervical fractures secondary  to trauma from syncopal episode..  Stable C4-C5 
fracture based on radiological finding of CT neck.  Those concern of arterial 
dissection however the lack of focal neurological deficits and a negative CT of 
the neck makes this less likely scenario.
 
* Gastric enteritis.  Possibly viral.  Tums seem to have resolved for now was no
report of any blood from either vomitus or bowel, Mayela therefore there is no need
for further follow-up such as stool culture and fecal analysis.  Is reasonable 
to assume that this was a case of possible viral gastroenteritis.
 
* Incidental finding of right lobe thyroid nodule.  Measuring 1.4 cm per CT 
neck.  No reports of thyroid symptoms such as increased weight gain or weight 
loss, palpitations, bradycardia Taylor vision problems or other thyroid symptoms. 
 
Plan
Admit to Telemetry for close cardiac monitoring of arythmias and conduction 
abnormalities
Trend trops to r/o ACS
Echocardiogram
Check orthostats now and qshift
Supportive/symptomatic care for c3-c4 fracture 
Adequate pain control
Await further neurosurgery reccs on managament of stable fracture
Check TSH and setting of thyroid nodule
Consider non emergent u/s for evaluation of thyroid nodule
 
 
As Ranked By This Provider
Problem List:
 1. Syncope
   Qualifiers
 Syncope type: unspecified Qualified Code: R55 - Syncope and collapse
 
 2. C5 vertebral fracture
   Qualifiers
 Encounter type: initial encounter Fracture type: closed Fracture morphology: 
unspecified fracture morphology
 
 3. C4 cervical fracture
   Qualifiers
 Encounter type: initial encounter Fracture type: closed Fracture morphology: 
other fracture
 
 4. Nausea vomiting and diarrhea
 
 
Core Measures/Misc (9/17)
 
Acute Coronary Syndrome
ACS Diagnosis: No
 
Congestive Heart Failure
Congestive Heart Failure Diagnosis No
 
Cerebrovascular Accident
CVA/TIA Diagnosis: No
 
VTE (View Protocol)
VTE Risk Factors Acute Medical Illness
No Mechanical VTE Prophylaxis d/t N/A MechProphylax Ordered
No VTE Pharm Prophylaxis d/t NA PharmProphylax ordered
 
Sepsis (View protocol)
Sepsis Present: Mitzi Hernandez MDCandelariasharon 02/08/18 2608:
Attending MD Review Statement
 
Attending Statement
Attending MD Statement: examined this patient, discuss w/resident/PA/NP, agreed 
w/resident/PA/NP, reviewed EMR data (avail), discussed with nursing, discussed 
with case mgmt, amended to note
Attending Assessment/Plan:
Patient seen and examined.  I reviewed and agree with the H&P as documented by 
the resident above.  Patient presented after syncope episode that led to a fall 
with head trauma.  Just prior to this she was nauseous, vomited and had 
diarrhea.  Emergency room she had another episode of loss of consciousness 
transiently while blood work was been attempted.  Apparently she was stuck 
several times and had an aura that something was going to happen before 
transiently losing consciousness.  She denies any similar history in the past.  
EKG in the emergency room shows normal sinus rhythm.  She has no significant 
electrolyte abnormalities on her labs.  She is noted to have a mild prerenal 
azotemia.  Her orthostatic blood pressure changes were negative she did have 
significant increase in her pulse rate.  Head CT shows no acute intracranial 
process however she did suffer cervical spine fracture.
 
In emergency room she is denying headache.  She is complaining of ongoing nausea
but denied any further vomiting.  She complains of abdominal cramping.  She has 
had no further episodes of diarrhea.
 
On examination she has mild infraorbital ecchymosis on the left.  No other 
obvious sign of head trauma.  She has no facial tenderness.  She has no 
neurologic deficits.  Power is 5 over 5 in all extremities.  She has no sensory 
deficits.  She has no carotid bruit.  Heart sounds are regular with no audible 
murmur.  Lungs are clear bilaterally.  Abdomen is nondistended, soft and 
nontender with normal bowel sounds.  She has mild edema of the left lower 
extremity compared to the right.  It is nonpitting.  Patient reports that this 
is chronic and unchanged.
 
Problems:
1.  Syncope.
2.  Head trauma secondary to fall.
3.  Cervical spine fracture.  C4 spinous process and C5 spinous process which is
small spinous process avulsion fracture fragment posteriorly..
4.  Nausea, vomiting and diarrhea; probably due to gastroenteritis.
5.  Prerenal azotemia
 
Plan:
Admit to the inpatient medical service.
Telemetry monitoring to rule out arrhythmia as etiology of her loss of 
consciousness.  Obtain echocardiogram and cardiology consultation.
Syncope episode appears to have a psychiatric service of the an episode of what 
occasions aggravated by her mild dehydrated state.
Neurosurgery consultation appreciated.  We'll continue management as 
recommended.  The neurosurgery service is recommending obtain an MRI at present 
unless is a change in her clinical status.  Obtain stat repeat head CT if 
patient develops any neurological deficits overnight.
Obtain Doppler of the lower extremity to rule out DVT.
DVT prophylaxis subcutaneous compression device overnight.  She remains with no 
new neurologic deficits overnight subcutaneous heparin may be utilized.
Continue antiemetic therapy with Zofran.  Hydrate gently with half-normal 
saline.

## 2018-02-08 NOTE — CT SCAN REPORT
EXAMINATION:
CT ABDOMEN AND PELVIS WITHOUT CONTRAST
 
CLINICAL INFORMATION:
Left lower quadrant abdominal pain. Nausea, vomiting, diarrhea. Syncope.
Presumptive diagnosis: Colitis, diverticulitis.
 
COMPARISON:
None
 
TECHNIQUE:
Multidetector volumetric imaging was performed from the superior aspect of
the liver through the pubic symphysis. Sagittal and coronal reformatted
images were obtained on the technologist's workstation.
 
DLP:
314.37 mGy-cm
 
FINDINGS:
 
LUNG BASES: The visualized lung bases are unremarkable.
 
LIVER, GALLBLADDER, AND BILIARY TREE: The liver is normal in size, shape, and
attenuation. No focal hepatic lesion or biliary ductal dilatation is present.
The gallbladder is unremarkable with no evidence of radiopaque gallstones,
gallbladder wall thickening, or obvious pericholecystic inflammatory changes.
 
 
PANCREAS: Unremarkable.
 
SPLEEN: Unremarkable.
 
ADRENAL GLANDS: Unremarkable.
 
KIDNEYS AND URETERS: The kidneys are normal in size, shape, and attenuation.
No hydronephrosis, hydroureter, or calculi are seen. No perinephric
stranding.
 
BLADDER: Unremarkable.
 
GASTROINTESTINAL TRACT: There are scattered colonic diverticula. There is no
evidence of diverticulitis. There is no definite colonic wall thickening to
suggest colitis. The appendix appears normal. There are 2 loops of jejunum in
the mid abdomen which are upper normal in caliber. There is no definite bowel
wall thickening. The small bowel is otherwise unremarkable.
 
ABDOMINAL WALL: No significant hernia is appreciated.
 
LYMPH NODES: Normal.
 
VASCULAR: Unremarkable.
 
PELVIC VISCERA: Unremarkable.
 
OSSEOUS STRUCTURES: There is severe degenerative disc disease at L3-L4 with a
mild grade 1 anterolisthesis of L3 on L4. There is mild degenerative disc
disease at L4-L5.
 
IMPRESSION:
1. Mild colonic diverticulosis with no evidence of diverticulitis. No colonic
wall thickening to suggest suggest colitis.
2. There are 2 loops of jejunum in the mid abdomen which are upper normal in
caliber but there is no evidence of small bowel wall thickening.
3. No other evidence of inflammatory process in the abdomen or pelvis.

## 2018-02-08 NOTE — ULTRASOUND REPORT
EXAMINATION:
US TRIPLEX OF LOWER EXTREMITIES, BILATERAL
 
CLINICAL INFORMATION:
Leg pain deep vein thrombosis.
 
COMPARISON:
None
 
TECHNIQUE:
Color-flow triplex imaging with spectral analysis and compression Doppler
were performed on the lower extremities.
 
FINDINGS:
Respiratory variation, normal compression and augmented flow are noted
throughout the lower extremities. The visualized common femoral vein,
superficial femoral vein, profunda femoral vein, popliteal vein and midcalf
peroneal and posterior tibial venous segments show no evidence of deep venous
thrombosis.
 
There is no Baker's cyst.
 
IMPRESSION:
Normal triplex scan without evidence of deep venous thrombosis involving the
lower extremities.

## 2018-02-08 NOTE — CT SCAN REPORT
CT ANGIOGRAM NECK
 
CLINICAL INFORMATION:
Rule out vertebral artery dissection. C4-C5 fracture.
 
COMPARISON:
Cervical spine CT performed earlier the same day.
 
TECHNIQUE:
Test bolus sequences followed by administration of 125 mL of Omnipaque 350
intravenous contrast. Helical imaging was performed in the axial plane of the
neck. The data was processed at the CT technologist workstation for
generation of MIP sequences. Three-dimensional volume rendered reformatted
images were also generated at an offline 3-D workstation. Stenoses graded per
NASCET criteria.
 
FINDINGS:
There is a 3 great vessel branch configuration off of the aortic arch. The
great vessel origins are widely patent. The proximal left subclavian artery
is tortuous. The right vertebral artery is dominant. There is no evidence of
traumatic arterial injury. There are no intraluminal filling defects and
there is no luminal caliber change along the course of the vertebral arteries
on either side. The common carotid arteries, the carotid bifurcations, and
the cervical internal carotid artery segments are widely patent. The
intracranial arterial vasculature is unremarkable.
 
Stable appearing fracture through the C4 spinous process extending to the
undersurface of the right C4 lamina. There is also a fracture through the
posterolateral wall of the right C4 bony foramen transversarium. Small
osteophyte versus inferior endplate avulsion fragment on the left at C4.
Given this finding, a MRI of the cervical spine would be helpful in excluding
the presence of ligamentous injury and/or epidural collections.  There is
also a fracture through the C5 spinous process with a small spinous process
avulsion fracture fragment posteriorly.
 
Degenerative changes throughout the cervical spine are described in detail on
the recent cervical spine CT.
 
Stable appearing superior end plate height loss at T3 of indeterminate age.
 
The imaged upper lungs are clear. There is a 1.4 cm right thyroid lobe nodule
that should be further assessed with a nonemergent ultrasound. There are no
additional significant soft tissue findings within the neck.
 
IMPRESSION:
- Stable appearing C4 and C5 fractures as discussed in detail on the cervical
spine CT performed earlier today.
 
- There is no evidence of traumatic arterial injury/arterial dissection. No
significant arterial stenoses and no acute arterial occlusions within the
neck.
 
- Stable appearing indeterminate age superior endplate height loss at T3.
 
- There is a 1.4 cm right thyroid lobe nodule that should be further assessed
with a nonemergent ultrasound.

## 2018-02-08 NOTE — ADMISSION CERTIFICATION
Admission Certification
 
Certification Statement
- As attending physician, I certify that at the time of
- admission, based on clinical presentation, severity of
- symptoms, need for further diagnostic testing and
- therapeutic interventions, and risk of adverse outcomes
- without in-hospital treatment, in my clinical assessment,
- this patient requires an acute hospital stay for a minimum
- of two nights or longer. I have also considered psychsocial
- factors such as support system, advanced age, financial
- issues, cognitive issues, and failed out-patient treatments,
- past re-admission history, safety of patient, and lack of
- compliance as applicable.
Specific rationale supporting this admission is:
Hospitalization is required for further workup of her syncope and management of 
her cervical spine fracture.

## 2018-02-08 NOTE — ECHOCARDIOGRAM REPORT
ANDRIY DIXON 
 
 Age:    62     :    1955      Gender:     F 
 
 MRN:    [id number] 
 
 Exam Date:     2018  
                14:43 
 
 Exam Location: ER 
 
 Ht (in):     64      Wt (lb):      164     BSA:    1.85 
 
 BP:          114     /     75 
 
 Ordering Physician:        Gabo Paz MD 
 
 Referring Physician:       Gabo Paz MD 
 
 Technologist:              Salud Chowdhury RDCS 
 
 Room Number:               ER#4 
 
 Indications:       PRESYNCOPE/SYNCOPE 
 
 Rhythm: 
 
 Technical Quality: 
 
 FINDINGS 
 
 Left Ventricle 
 Small left ventricular cavity. Mild concentric left ventricular  
 hypertrophy. Normal left ventricular wall motion. Normal left  
 ventricular ejection fraction visually estimated at >65%. Abnormal  
 relaxation filling pattern of the left ventricle for age (stage 1  
 diastolic dysfunction). 
 
 Right Ventricle 
 Normal right ventricular size and function. 
 
 Right Atrium 
 Normal right atrial size. 
 
 Left Atrium 
 Normal left atrial size. 
 
 Mitral Valve 
 Mild mitral annular calcification.  Mitral valve mildly thickened.   
 Mild mitral regurgitation. 
 
 Aortic Valve 
 Trileaflet aortic valve. No aortic valve stenosis or regurgitation. 
 
 Tricuspid Valve 
 Tricuspid valve mildly thickened.  Mild tricuspid regurgitation. No  
 evidence of pulmonary hypertension. Right ventricular systolic  
 pressure estimated to be within the normal range at 21 mmHg. 
 
 Pulmonic Valve 
 Pulmonic valve not well visualized, grossly normal. No pulmonic  
 regurgitation. 
 
 Pericardium 
 No pericardial effusion. 
 
 Great Vessels 
 Normal size aortic root.  Normal size inferior vena cava. 
 
 CONCLUSIONS 
 Small left ventricular cavity. 
 Mild concentric left ventricular hypertrophy. 
 Normal left ventricular wall motion. 
 Normal left ventricular ejection fraction visually estimated at > 
 65%. 
 Abnormal relaxation filling pattern of the left ventricle for age  
 (stage 1 diastolic dysfunction). 
 Normal right ventricular size and function. 
 Normal atrial size. 
 Mild mitral regurgitation. 
 Mild tricuspid regurgitation. 
 No evidence of pulmonary hypertension. 
 
 Jerry Hou M.D. 
 (Electronically Signed) 
 Final Date:      2018  
                  18:01 
 
 MEASUREMENTS  (Male / Female) Normal Values 
 
 2D ECHO 
 LV Diastolic Diameter PLAX        3.1 cm                4.2 - 5.9 / 3.9 - 5.3 
cm 
 LV Systolic Diameter PLAX         1.7 cm                2.1 - 4.0 cm 
 LV Fractional Shortening PLAX     45.2 %                25 - 46  % 
 LV Ejection Fraction 2D Teich     77.9 %                 
 IVS Diastolic Thickness           1.3 cm                 
 LVPW Diastolic Thickness          1.3 cm                 
 LV Relative Wall Thickness        0.8                    
 RV Internal Dim ED PLAX           3.2 cm                1.9 - 3.8 cm 
 LVOT Diameter                     2.0 cm                 
 Aortic Root Diameter              2.6 cm                 
 LA Systolic Diameter LX           2.9 cm                3.0 - 4.0 / 2.7 - 3.8 
cm 
 LA Volume                         40.0 cm              18 - 58 / 22 - 52 cm 
 Ascending Aorta Diameter          3.0 cm                 
 
 DOPPLER 
 AV Peak Velocity                  162.0 cm/s             
 AV Peak Gradient                  10.5 mmHg              
 AV Mean Velocity                  123.0 cm/s             
 AV Mean Gradient                  7.0 mmHg               
 AV Velocity Time Integral         31.5 cm                
 LVOT Peak Velocity                118.0 cm/s             
 LVOT Peak Gradient                5.6 mmHg               
 LVOT Mean Velocity                80.1 cm/s              
 LVOT Mean Gradient                3.0 mmHg               
 LVOT Velocity Time Integral       22.4 cm                
 LVOT Stroke Volume                70.4 cm               
 AV Area Cont Eq vti               2.2 cm                
 AV Area Cont Eq pk                2.3 cm                
 MV Peak Velocity                  96.1 cm/s              
 MV Peak Gradient                  3.7 mmHg               
 MV Mean Velocity                  62.6 cm/s              
 MV Mean Gradient                  2.0 mmHg               
 Mitral E Point Velocity           74.0 cm/s              
 Mitral A Point Velocity           94.3 cm/s              
 Mitral E to A Ratio               0.8                    
 MV PHT Velocity                   80.5 cm/s              
 MV Deceleration Murray             343.0 cm/s            
 MV Pressure Half Time             70.4 ms                
 MV Area PHT                       3.1 cm                
 MV Deceleration Time              98.0 ms                
 TR Peak Velocity                  201.0 cm/s             
 TR Peak Gradient                  16.2 mmHg              
 Right Atrial Pressure             5.0 mmHg               
 Pulmonary Artery Systolic Pressu  21.2 mmHg              
 Right Ventricular Systolic Press  21.2 mmHg              
 PV Peak Velocity                  103.0 cm/s             
 PV Peak Gradient                  4.2 mmHg               
 PV Mean Velocity                  76.9 cm/s              
 PV Mean Gradient                  3.0 mmHg               
 PV Velocity Time Integral         15.4 cm                
 LV E' Lateral Velocity            14.2 cm/s              
 Mitral E to LV E' Lateral Ratio   5.2                    
 LV E' Septal Velocity             4.5 cm/s               
 Mitral E to LV E' Septal Ratio    16.6
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
## 2018-02-09 VITALS — DIASTOLIC BLOOD PRESSURE: 68 MMHG | SYSTOLIC BLOOD PRESSURE: 122 MMHG

## 2018-02-09 NOTE — CT SCAN REPORT
EXAMINATION:
CT HEAD WITHOUT CONTRAST
 
CLINICAL INFORMATION:
Head trauma. Assess for interval changes. Left ecchymosis.
 
COMPARISON:
CT scan of the head 02/08/2018.
 
TECHNIQUE:
Contiguous axial imaging was performed from the skull base to vertex without
intravenous administration of contrast.
 
DLP:
620.93 mGy-cm
 
FINDINGS:
There is no evidence of acute intracranial hemorrhage or territorial
infarction. No abnormal mass effect or midline shift is seen. Gray to white
matter differentiation is well preserved. No extra-axial fluid collections
are identified.
 
The ventricles are normal in size. There is no abnormal attenuation within
the brain parenchyma. There are no large scalp contusions or hematomas. There
are no acute osseous findings. There are degenerative changes of the
bilateral temporomandibular joints. There are atheromatous calcifications of
the cavernous internal carotid arteries. There is a small amount of debris in
the right greater than left external auditory canals. The middle ear cavities
are well-aerated. The mastoid air cells and visualized paranasal sinuses are
well-aerated.
 
IMPRESSION:
1. There are no acute bleeds or territorial infarcts.
 
2. There are no acute osseous or soft tissue abnormalities.

## 2018-02-09 NOTE — PATIENT DISCHARGE INSTRUCTIONS
Discharge Instructions
 
General Discharge Information
You were seen/treated for:
Syncope (Loss of conciousness)
Neck spine fracture
Special Instructions:
Please seek immediate medical attention if you experience another episode of 
"passing out".
Please seek immediate medical attention if you develop increase neck pain.
Please follow up with Dr Herrera within 2-3 weeks regarding follow up on your 
neck fracture
Continue wearing your hard collar on the neck as instructed by Dr Herrera.
Please follow up with your primary care within 1 week regarding workup for your 
thyroid nodule, which may include an Ultrasound and aspiration.
 
Diet
Recommended Diet: Regular
 
Acute Coronary Syndrome
 
Inclusion Criteria
At DC or during hospital stay patient has or had the following:
ACS DIAGNOSIS No
 
Discharge Core Measures
Meds if any: Prescribed or Continued at Discharge
Meds if any: NOT Prescribed or Continued at Discharge
 
Congestive Heart Failure
 
Inclusion Criteria
At DC or during hospital stay patient has or had the following:
CHF DIAGNOSIS No
 
Discharge Core Measures
Meds if any: Prescribed or Continued at Discharge
Meds if any: NOT Prescribed or Continued at Discharge
 
Cerebrovascular accident
 
Inclusion Criteria
At DC or during hospital stay patient has or had the following:
CVA/TIA Diagnosis No
 
Discharge Core Measures
Meds if any: Prescribed or Continued at Discharge
Meds if any: NOT Prescribed or Continued at Discharge
 
Venous thromboembolism
 
Inclusion Criteria
VTE Diagnosis No
VTE Type NONE
VTE Confirmed by (Test) NONE
 
Discharge Core Measures
- Per Current guidelines, there needs to be overlap
- treatment for the first 5 days of Warfarin therapy.
- If discharged on Warfarin prior to 5 days of
- overlap therapy, the patient will need to be
- assessed for post discharge needs including
- *Post discharge parental anticoagulation
- *Warfarin and/or parental anticoagulation education
- *Follow up date to check INR post discharge
At least 5 days overlap therapy as Inpatient No
Meds if any: Prescribed or Continued at Discharge
Note: Overlap Therapy is Warfarin and Anticoagulant
Meds if any: NOT Prescribed or Continued at Discharge

## 2018-02-09 NOTE — PN- HOUSESTAFF
Jackson POPE,G[place] 18 0737:
Subjective
Follow-up For:
syncope
cervical fracture
Prerenal azotemia
Subjective:
Seen and examined at bedside. No longer complains of significant nausea. She 
however endorses upper repsiratory symptoms of cough, and sinus congestion, but 
no fever. Flu test -ve and she remains afebrile. She states "I think i might be 
catching something". 
 
Review of Systems
Constitutional:
Reports: see HPI. 
 
Objective
Last 24 Hrs of Vital Signs/I&O
 Vital Signs
 
 
Date Time Temp Pulse Resp B/P B/P Pulse O2 O2 Flow FiO2
 
     Mean Ox Delivery Rate 
 
 2200 99.3 105 20 112/80  95 Room Air  
 
 1659 99.3 102 20 122/76  98   
 
 1250 98.7 110 20 114/75  98 Room Air  
 
 1100  94  102/60     
 
 1017 98.3 103 20 125/76  97 Room Air  
 
 
 Intake & Output
 
 
  0800 / 0000  1600
 
Intake Total 1000 1100 1000
 
Output Total   
 
Balance 1000 1100 1000
 
    
 
Intake,  102 9105
 
Intake, Oral 200 300 0
 
Patient  74.843 kg 
 
Weight   
 
Weight  Reported by Patient 
 
Measurement   
 
Method   
 
 
 
 
Physical Exam
General Appearance: Alert, Oriented X3, Cooperative
Cardiovascular: Regular Rate, Normal S1, Normal S2, No Murmurs
Lungs: Clear to Auscultation, Normal Air Movement
Abdomen: Normal Bowel Sounds, Soft, No Tenderness
Other Physical Findings:
Skin No Significant Lesion, echymotic areas around left eye, non worsening 
compared to yesterday.
Skin Temp/Moisture Exam: Warm/Dry
Sepsis Skin Exam (color): Normal for Ethnicity
HEENT PERRLA, ecchymosis around the left eye bwith no worsening discoloration or
edema or swelling, rest of neck is atruamatic
Neck Supple, No JVD
Lymphatic Cervical nl
Cardiovascular Regular Rate, Normal S1, Normal S2, No Murmurs
Lungs Clear to Auscultation, Normal Air Movement
Abdomen Normal Bowel Sounds, Soft, No Tenderness
Neurological Normal Gait, Normal Speech, Strength at 5/5 X4 Ext, Sensation 
Intact, Cranial Nerves 3-12 NL, Reflexes 2+
Extremities No Clubbing, No Cyanosis
Vascular Normal Pulses, Pulses Symmetrical
 
Assessment/Plan
Assessment:
61 yo lady with no past cardiac medical history presents for evaluation of a 
syncopal event which was preceeded by 4 hours of gastroeneteritis symptoms of 
nausea and vomiting. No conduction abnormalities or arythmia noted in repeat ekg
and telemetry monitrs o/n.  Syncopal event resulted in head/neck trauma due to 
the fall. CT head negative for any acute bleed or fracture, [facility] CT neck was 
remarkable for c4-c5 stable fracture.
 
Impression
* Syncope.
*  Head trauma secondary to fall.
* Cervical spine fracture.  C4 spinous process and C5 spinous process which is 
small spinous     process avulsion fracture fragment posteriorly.
*   Nausea, vomiting and diarrhea; probably due to gastroenteritis.
*   Prerenal azotemia
* Thyroid nodule with normal TSH
 
Plan
Pt is stable today, ambulating well without any repot of dizziness,neuro changes
or any more syncopal episodes. No acute event reported on telemetry monitoring 
in the past 24 hrs. Spoke with cardiology (Dr Hou) who feels that he is 
stable for discharge from cardiac point of view. Her repeat CT is unremarkable 
for any bleed or fracture. She will be going home with the hard collar for her  
C4-C5 compression fractures. She does not want any pain meds prescribed, she 
wants to manage the pain with OTC meds. She is to f/u with Dr Herrera on her 
fracture mangament. Regarding the incidentgal finding of aa thyroid nodule, her 
TSH is normal. She will need follow up with an u/s on outpatient basis, This was
communicated to the patient and will also be indicated specifically on the 
discharge summary. 
Problem List:
 1. Nausea vomiting and diarrhea
 
 2. Syncope
 
 3. C5 vertebral fracture
 
 4. C4 cervical fracture
 
Pain Ratin
Pain Location:
neck
Pain Goal: Remain pain free
Pain Plan:
per pathway
Tomorrow's Labs & Rationales:
none-discharge
 
 
Bhavani Hernandez MD 18 1327:
Attending MD Review Statement
 
Attending Statement
Attending MD Statement: examined this patient, discuss w/resident/PA/NP, agreed 
w/resident/PA/NP, reviewed EMR data (avail), discussed with nursing, discussed 
with case mgmt, amended to note
Attending Assessment/Plan:
Patient seen and examined.  Ambulating freely around the unit and not in any 
distress.  Denies headache or blurry vision.  On examination she has no focal 
neurologic deficit.  The ecchymotic area on the her left eye is unchanged.  She 
continues to use her heart:.  She had no events on telemetry monitoring 
overnight.  Heart sounds remain regular with no audible murmur.  Laboratory data
today shows resolution of her prerenal azotemia.  Her syncope was likely 
vasovagal event aggravated by her dehydration.  She had no events on telemetry 
monitoring.  Repeat head CT today shows no changes compared to yesterday's 
imaging.  Awaiting evaluation by the cardiology service today.  If no further 
recommendations he may be discharged home today and follow-up as an outpatient. 
She will have her neck collar in place and follow-up with a the neurosurgery 
service.  She was incidentally noted to have a thyroid nodule I will follow-up 
with her primary care provider as an outpatient.

## 2018-02-09 NOTE — RADIOLOGY REPORT
EXAMINATION:
XR PORTABLE CHEST
 
CLINICAL INFORMATION:
Syncope.
 
COMPARISON:
None
 
TECHNIQUE:
Portable frontal view of the chest was obtained. 10:25 PM
 
FINDINGS:
No significant abnormality is noted involving the heart, lungs, mediastinum,
bony thorax or soft tissues.
 
IMPRESSION:
Unremarkable examination.

## 2018-02-09 NOTE — DISCHARGE SUMMARY
Hospital Course
Allergies:
Coded Allergies:
No Known Allergies (02/08/18)
 
 
Disposition Summary
 
Disposition
Principal Diagnosis:
Syncope.
Fall with head trauma.
C4-C5 spinous process fracture.
 
Attending MD Review Statement
Documenting Attending:
Bhavani Hernandez MD
Other Findings:
Patient to follow-up with the neurosurgery service as an outpatient.